# Patient Record
Sex: FEMALE | Race: WHITE
[De-identification: names, ages, dates, MRNs, and addresses within clinical notes are randomized per-mention and may not be internally consistent; named-entity substitution may affect disease eponyms.]

---

## 2020-09-08 ENCOUNTER — HOSPITAL ENCOUNTER (EMERGENCY)
Dept: HOSPITAL 54 - ER | Age: 35
Discharge: HOME | End: 2020-09-08
Payer: COMMERCIAL

## 2020-09-08 VITALS — SYSTOLIC BLOOD PRESSURE: 122 MMHG | DIASTOLIC BLOOD PRESSURE: 72 MMHG

## 2020-09-08 VITALS — WEIGHT: 150 LBS | HEIGHT: 67 IN | BODY MASS INDEX: 23.54 KG/M2
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
## 2020-09-08 DIAGNOSIS — M54.2: ICD-10-CM
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
## 2020-09-08 DIAGNOSIS — S06.0X0A: Primary | ICD-10-CM

## 2020-09-08 DIAGNOSIS — R51: ICD-10-CM

## 2020-09-08 NOTE — NUR
PT AAOX4. AMBUALTORY WITH STEADY GAIT. BIBSELF C/O HEAD PAIN AND NECK S/P AN 
OBNJECT FELL ON HER HEAD LAST SATURDAY. NO LOC. VSS. NO NEURO DEFICIT. MD AT 
BEDSIDE FOR EVAL.